# Patient Record
Sex: MALE | Race: WHITE | Employment: FULL TIME | URBAN - METROPOLITAN AREA
[De-identification: names, ages, dates, MRNs, and addresses within clinical notes are randomized per-mention and may not be internally consistent; named-entity substitution may affect disease eponyms.]

---

## 2017-03-20 NOTE — PATIENT INSTRUCTIONS
Preventive Health Recommendations  Male Ages 40 to 49    *   Overall, doing well.     *   Drop 15 lbs and keep it off.     Yearly exam:             See your health care provider every year in order to  o   Review health changes.   o   Discuss preventive care.    o   Review your medicines if your doctor has prescribed any.    You should be tested each year for STDs (sexually transmitted diseases) if you re at risk.     Have a cholesterol test every 5 years.     Have a colonoscopy (test for colon cancer) if someone in your family has had colon cancer or polyps before age 50.     After age 45, have a diabetes test (fasting glucose). If you are at risk for diabetes, you should have this test every 3 years.      Talk with your health care provider about whether or not a prostate cancer screening test (PSA) is right for you.    Shots: Get a flu shot each year. Get a tetanus shot every 10 years.     Nutrition:    Eat at least 5 servings of fruits and vegetables daily.     Eat whole-grain bread, whole-wheat pasta and brown rice instead of white grains and rice.     Talk to your provider about Calcium and Vitamin D.     Lifestyle    Exercise for at least 150 minutes a week (30 minutes a day, 5 days a week). This will help you control your weight and prevent disease.     Limit alcohol to one drink per day.     No smoking.     Wear sunscreen to prevent skin cancer.     See your dentist every six months for an exam and cleaning.

## 2017-03-20 NOTE — PROGRESS NOTES
SUBJECTIVE:     CC: Isaac Bello is an 41 year old male who presents for preventative health visit.     Healthy Habits:    Do you get at least three servings of calcium containing foods daily (dairy, green leafy vegetables, etc.)? yes    Amount of exercise or daily activities, outside of work: 3 day(s) per week    Problems taking medications regularly No    Medication side effects: No    Have you had an eye exam in the past two years? no    Do you see a dentist twice per year? Yes    Do you have sleep apnea, excessive snoring or daytime drowsiness? no      - History of elevated BP without diagnosis of hypertension. Patient's recent BPs have been within normal guidelines.    BP Readings from Last 6 Encounters:   03/24/17 122/70   01/23/14 122/77   11/13/13 129/76   09/30/13 122/80   02/09/12 145/78   02/01/11 110/66     - Discussed weight. Patient reports he has been able to get down to 210 lbs multiple times in the last few years, but has had trouble staying at a lower weight.     Wt Readings from Last 10 Encounters:   03/24/17 227 lb 2 oz (103 kg)   01/23/14 226 lb (102.5 kg)   11/13/13 226 lb (102.5 kg)   09/30/13 219 lb (99.3 kg)   02/09/12 229 lb (103.9 kg)   02/01/11 225 lb (102.1 kg)   08/09/10 223 lb (101.2 kg)   06/21/10 219 lb (99.3 kg)   05/28/10 210 lb 8 oz (95.5 kg)       Family History: Colon cancer (Paternal aunt), melanoma (Mother). No family history of prostate cancer or heart disease.    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 Father of 11-year-old twins.        Today's PHQ-2 Score:   PHQ-2 ( 1999 [employer]) 3/24/2017 9/30/2013   Q1: Little interest or pleasure in doing things 0 0   Q2: Feeling down, depressed or hopeless 0 0   PHQ-2 Score 0 0       Abuse: Current or Past(Physical, Sexual or Emotional)- No  Do you feel safe in your environment - Yes    Social History   Substance Use Topics     Smoking status: Never Smoker     Smokeless tobacco: Never  "Used     Alcohol use Yes      Comment: occl     The patient does not drink >3 drinks per day nor >7 drinks per week.    Last PSA: No results found for: PSA    Recent Labs   Lab Test  02/01/11   0809   CHOL  182   HDL  48   LDL  118   TRIG  74   CHOLHDLRATIO  4.0       Reviewed orders with patient. Reviewed health maintenance and updated orders accordingly - Yes    Reviewed and updated as needed this visit by clinical staff  Tobacco  Allergies  Meds  Med Hx  Surg Hx  Fam Hx  Soc Hx        Reviewed and updated as needed this visit by Provider          ROS:  C: NEGATIVE for fever, chills, significant change in weight  I: Patient seen by Dermatology yesterday, reports normal exam. NEGATIVE for worrisome rashes, moles or lesions   E: NEGATIVE for vision changes or irritation  ENT: NEGATIVE for ear, mouth and throat problems  R: NEGATIVE for significant cough or SOB  CV: NEGATIVE for chest pain, palpitations or peripheral edema  GI: NEGATIVE for nausea, abdominal pain, heartburn, or change in bowel habits  M: NEGATIVE for significant arthralgias or myalgia  N: NEGATIVE for weakness, dizziness or paresthesias      This document serves as a record of the services and decisions personally performed and made by Brenda Marx MD. It was created on his behalf by Fallon Palmer, a trained medical scribe. The creation of this document is based the provider's statements to the medical scribe.  Fallon Palmer 8:08 AM March 24, 2017    Problem list, Medication list, Allergies, and Medical/Social/Surgical histories reviewed in [facility] and updated as appropriate.  OBJECTIVE:     /70 (BP Location: Left arm, Patient Position: Chair, Cuff Size: Adult Large)  Pulse 66  Temp 98.6  F (37  C) (Tympanic)  Ht 5' 11\" (1.803 m)  Wt 227 lb 2 oz (103 kg)  BMI 31.68 kg/m2     EXAM:  GENERAL: healthy, alert and no distress  RESP: lungs clear to auscultation - no rales, rhonchi or wheezes  CV: regular rate and rhythm, normal S1 S2, no " "murmur  MS: no gross musculoskeletal defects noted, no edema  SKIN: no suspicious lesions or rashes  NEURO: Normal strength and tone, mentation intact and speech normal  PSYCH: mentation appears normal, affect normal/bright    ASSESSMENT/PLAN:       (Z00.00) Routine general medical examination at a health care facility  (primary encounter diagnosis)  Comment: Overall doing well. Discussed lifestyle.  Plan: Encouraged regular exercise, weight loss ~10-15 lbs.     (Z13.6) CARDIOVASCULAR SCREENING; LDL GOAL LESS THAN 160  Comment: Routine.  Plan: Lipid Profile with reflex to direct LDL - Results pending.    (Z13.1) Screening for diabetes mellitus  Comment: Routine.  Plan: Glucose - Results pending.    (Z80.0) Family history of colon cancer  Comment: Second degree relative (paternal aunt). Updated problem list.  Plan: Routine colonoscopy at age 49.      Patient will follow up in 12 months for annual physical, sooner PRN for other health maintenance. Patient instructed to call with any questions or concerns.      COUNSELING:  Reviewed preventive health counseling, as reflected in patient instructions  Special attention given to:        Regular exercise       Healthy diet/nutrition         reports that he has never smoked. He has never used smokeless tobacco.    Estimated body mass index is 31.68 kg/(m^2) as calculated from the following:    Height as of this encounter: 5' 11\" (1.803 m).    Weight as of this encounter: 227 lb 2 oz (103 kg).   Weight management plan: Encouraged low-carb diet, regular exercise.    Counseling Resources:  ATP IV Guidelines  Pooled Cohorts Equation Calculator  FRAX Risk Assessment  ICSI Preventive Guidelines  Dietary Guidelines for Americans, 2010  USDA's MyPlate  ASA Prophylaxis  Lung CA Screening    The information in this document, created by a scribe for me, accurately reflects the services I personally performed and the decisions made by me. I have reviewed and approved this document for " accuracy.  8:13 AM March 24, 2017    Brenda Marx MD  [facility]

## 2017-03-24 ENCOUNTER — OFFICE VISIT (OUTPATIENT)
Dept: FAMILY MEDICINE | Facility: CLINIC | Age: 42
End: 2017-03-24
Payer: COMMERCIAL

## 2017-03-24 VITALS
BODY MASS INDEX: 31.8 KG/M2 | HEIGHT: 71 IN | SYSTOLIC BLOOD PRESSURE: 122 MMHG | TEMPERATURE: 98.6 F | DIASTOLIC BLOOD PRESSURE: 70 MMHG | WEIGHT: 227.13 LBS | HEART RATE: 66 BPM

## 2017-03-24 DIAGNOSIS — Z00.00 ROUTINE GENERAL MEDICAL EXAMINATION AT A HEALTH CARE FACILITY: Primary | ICD-10-CM

## 2017-03-24 DIAGNOSIS — Z80.0 FAMILY HISTORY OF COLON CANCER: ICD-10-CM

## 2017-03-24 DIAGNOSIS — Z13.6 CARDIOVASCULAR SCREENING; LDL GOAL LESS THAN 160: ICD-10-CM

## 2017-03-24 DIAGNOSIS — Z13.1 SCREENING FOR DIABETES MELLITUS: ICD-10-CM

## 2017-03-24 LAB
CHOLEST SERPL-MCNC: 211 MG/DL
GLUCOSE SERPL-MCNC: 101 MG/DL (ref 70–99)
HDLC SERPL-MCNC: 63 MG/DL
LDLC SERPL CALC-MCNC: 129 MG/DL
NONHDLC SERPL-MCNC: 148 MG/DL
TRIGL SERPL-MCNC: 97 MG/DL

## 2017-03-24 PROCEDURE — 82947 ASSAY GLUCOSE BLOOD QUANT: CPT | Performed by: FAMILY MEDICINE

## 2017-03-24 PROCEDURE — 36415 COLL VENOUS BLD VENIPUNCTURE: CPT | Performed by: FAMILY MEDICINE

## 2017-03-24 PROCEDURE — 99396 PREV VISIT EST AGE 40-64: CPT | Performed by: FAMILY MEDICINE

## 2017-03-24 PROCEDURE — 80061 LIPID PANEL: CPT | Performed by: FAMILY MEDICINE

## 2017-03-24 NOTE — MR AVS SNAPSHOT
After Visit Summary   3/24/2017    Isaac Bello    MRN: [id number]           Patient Information     Date Of Birth          1975        Visit Information        Provider Department      3/24/2017 7:40 AM Brenda Marx MD [facility]        Today's Diagnoses     Routine general medical examination at a health care facility    -  1    CARDIOVASCULAR SCREENING; LDL GOAL LESS THAN 160        Screening for diabetes mellitus        Family history of colon cancer          Care Instructions      Preventive Health Recommendations  Male Ages 40 to 49    *   Overall, doing well.     *   Drop 15 lbs and keep it off.     Yearly exam:             See your health care provider every year in order to  o   Review health changes.   o   Discuss preventive care.    o   Review your medicines if your doctor has prescribed any.    You should be tested each year for STDs (sexually transmitted diseases) if you re at risk.     Have a cholesterol test every 5 years.     Have a colonoscopy (test for colon cancer) if someone in your family has had colon cancer or polyps before age 50.     After age 45, have a diabetes test (fasting glucose). If you are at risk for diabetes, you should have this test every 3 years.      Talk with your health care provider about whether or not a prostate cancer screening test (PSA) is right for you.    Shots: Get a flu shot each year. Get a tetanus shot every 10 years.     Nutrition:    Eat at least 5 servings of fruits and vegetables daily.     Eat whole-grain bread, whole-wheat pasta and brown rice instead of white grains and rice.     Talk to your provider about Calcium and Vitamin D.     Lifestyle    Exercise for at least 150 minutes a week (30 minutes a day, 5 days a week). This will help you control your weight and prevent disease.     Limit alcohol to one drink per day.     No smoking.     Wear sunscreen to prevent skin cancer.     See your dentist every six  "months for an exam and cleaning.            Follow-ups after your visit        Who to contact     Normal or non-critical lab and imaging results will be communicated to you by [employer], letter or phone within 4 business days after the clinic has received the results. If you do not hear from us within 7 days, please contact the clinic through [employer] or phone. If you have a critical or abnormal lab result, we will notify you by phone as soon as possible.  Submit refill requests through [employer] or call your pharmacy and they will forward the refill request to us. Please allow 3 business days for your refill to be completed.          If you need to speak with a  for additional information , please call: [phone]           Additional Information About Your Visit        [employer] Information     [employer] gives you secure access to your electronic health record. If you see a primary care provider, you can also send messages to your care team and make appointments. If you have questions, please call your primary care clinic.  If you do not have a primary care provider, please call [phone] and they will assist you.        Care EveryWhere ID     This is your Care EveryWhere ID. This could be used by other organizations to access your [facility] medical records  [id number]        Your Vitals Were     Pulse Temperature Height BMI (Body Mass Index)          66 98.6  F (37  C) (Tympanic) 5' 11\" (1.803 m) 31.68 kg/m2         Blood Pressure from Last 3 Encounters:   03/24/17 122/70   01/23/14 122/77   11/13/13 129/76    Weight from Last 3 Encounters:   03/24/17 227 lb 2 oz (103 kg)   01/23/14 226 lb (102.5 kg)   11/13/13 226 lb (102.5 kg)              We Performed the Following     Glucose     Lipid Profile with reflex to direct LDL          Today's Medication Changes          These changes are accurate as of: 3/24/17  8:14 AM.  If you have any questions, ask your nurse or doctor.               Stop taking " these medicines if you haven't already. Please contact your care team if you have questions.     budesonide 0.5 MG/2ML neb solution   Commonly known as:  PULMICORT   Stopped by:  Brenda Marx MD                    Primary Care Provider Office Phone # Fax #    Manuel Yun [phone]       [facility] [address]   [place] [address]        Thank you!     Thank you for choosing [facility]  for your care. Our goal is always to provide you with excellent care. Hearing back from our patients is one way we can continue to improve our services. Please take a few minutes to complete the written survey that you may receive in the mail after your visit with us. Thank you!             Your Updated Medication List - Protect others around you: Learn how to safely use, store and throw away your medicines at www.disposemymeds.org.          This list is accurate as of: 3/24/17  8:14 AM.  Always use your most recent med list.                   Brand Name Dispense Instructions for use    ALLERGEN IMMUNOTHERAPY PRESCRIPTION          fluticasone 50 MCG/ACT spray    FLONASE    3 Package    Spray 2 sprays in nostril daily as needed for rhinitis       ZYRTEC ALLERGY 10 MG Caps   Generic drug:  cetirizine HCl      Take 1 capsule by mouth daily

## 2017-03-24 NOTE — NURSING NOTE
"Chief Complaint   Patient presents with     Physical       Initial /70 (BP Location: Left arm, Patient Position: Chair, Cuff Size: Adult Large)  Pulse 66  Temp 98.6  F (37  C) (Tympanic)  Ht 5' 11\" (1.803 m)  Wt 227 lb 2 oz (103 kg)  BMI 31.68 kg/m2 Estimated body mass index is 31.68 kg/(m^2) as calculated from the following:    Height as of this encounter: 5' 11\" (1.803 m).    Weight as of this encounter: 227 lb 2 oz (103 kg).  Medication Reconciliation: complete    "

## 2018-01-27 ENCOUNTER — TRANSFERRED RECORDS (OUTPATIENT)
Dept: HEALTH INFORMATION MANAGEMENT | Facility: CLINIC | Age: 43
End: 2018-01-27

## 2019-03-19 ENCOUNTER — TRANSFERRED RECORDS (OUTPATIENT)
Dept: HEALTH INFORMATION MANAGEMENT | Facility: CLINIC | Age: 44
End: 2019-03-19

## 2019-04-03 ENCOUNTER — OFFICE VISIT (OUTPATIENT)
Dept: FAMILY MEDICINE | Facility: CLINIC | Age: 44
End: 2019-04-03
Payer: COMMERCIAL

## 2019-04-03 VITALS
BODY MASS INDEX: 33.07 KG/M2 | SYSTOLIC BLOOD PRESSURE: 100 MMHG | WEIGHT: 236.25 LBS | RESPIRATION RATE: 14 BRPM | DIASTOLIC BLOOD PRESSURE: 70 MMHG | HEIGHT: 71 IN | TEMPERATURE: 98.9 F | HEART RATE: 62 BPM

## 2019-04-03 DIAGNOSIS — Z13.1 SCREENING FOR DIABETES MELLITUS: ICD-10-CM

## 2019-04-03 DIAGNOSIS — Z13.6 CARDIOVASCULAR SCREENING; LDL GOAL LESS THAN 160: ICD-10-CM

## 2019-04-03 DIAGNOSIS — K57.32 DIVERTICULITIS OF COLON: Primary | ICD-10-CM

## 2019-04-03 LAB
CHOLEST SERPL-MCNC: 189 MG/DL
GLUCOSE SERPL-MCNC: 96 MG/DL (ref 70–99)
HDLC SERPL-MCNC: 58 MG/DL
LDLC SERPL CALC-MCNC: 116 MG/DL
NONHDLC SERPL-MCNC: 131 MG/DL
TRIGL SERPL-MCNC: 73 MG/DL

## 2019-04-03 PROCEDURE — 80061 LIPID PANEL: CPT | Performed by: FAMILY MEDICINE

## 2019-04-03 PROCEDURE — 82947 ASSAY GLUCOSE BLOOD QUANT: CPT | Performed by: FAMILY MEDICINE

## 2019-04-03 PROCEDURE — 36415 COLL VENOUS BLD VENIPUNCTURE: CPT | Performed by: FAMILY MEDICINE

## 2019-04-03 PROCEDURE — 99214 OFFICE O/P EST MOD 30 MIN: CPT | Performed by: FAMILY MEDICINE

## 2019-04-03 ASSESSMENT — PAIN SCALES - GENERAL: PAINLEVEL: NO PAIN (0)

## 2019-04-03 ASSESSMENT — MIFFLIN-ST. JEOR: SCORE: 1983.75

## 2019-04-03 NOTE — PATIENT INSTRUCTIONS
*   Could be traveler's diarrhea or diverticulitis. Not sure.     *   Okay to watch and wait.     *   No urgent need for a colonoscopy.     *   Come back this summer for a general physical.     *   No dietary recommendations.     *   Your blood pressure is fine.     *   Call or use [employer] with any questions.

## 2019-04-03 NOTE — PROGRESS NOTES
SUBJECTIVE:   Isaac Bello is a 43 year old male who presents to clinic today for the following health issues:      ED/UC Followup:    Facility:  [facility]  Date of visit: 3/19/2019  Reason for visit: Acute diverticulitis  Current Status: He has completed courses of flagyl and omnicef with improvement. No current fevers. No abdominal or back ain now, no urinary sx, constipation, emesis or nausea. He is having frequent loose stools. Finished antibiotics 3/29/2019. Notes more aware of digestive system.       EXAM: CT ABDOMEN PELVIS W    LOCATION: [facility] [place]    DATE/TIME: 3/19/2019 7:32 PM        INDICATION: Left lower quadrant pain.        FINDINGS:     LUNG BASES: Negative.        ABDOMEN: Mild mesenteric stranding around the mid descending colon. There is an    adjacent diverticuli, therefore this most likely represents diverticulitis. No    perforation or abscess seen.        Small hepatic cyst. Circumaortic left renal vein. Left renal small cyst.    Normal-appearing right kidney, adrenals, spleen, contracted gallbladder, and    pancreas.        PELVIS: Sigmoid diverticuli also. Small bowel and appendix appear normal.        MUSCULOSKELETAL: Negative.        CONCLUSION:     1.  Mild mid descending colon diverticulitis.        Per Radiologist           Problem list and histories reviewed & adjusted, as indicated.  Additional history: as documented    Labs reviewed in EPIC    Reviewed and updated as needed this visit by clinical staff  Tobacco  Allergies  Meds  Med Hx  Surg Hx  Fam Hx  Soc Hx      Reviewed and updated as needed this visit by Provider         ROS:  CONSTITUTIONAL: NEGATIVE for fever, chills, change in weight  INTEGUMENTARY/SKIN: NEGATIVE for worrisome rashes, moles or lesions  ENT/MOUTH: NEGATIVE for ear, mouth and throat problems  RESP: NEGATIVE for significant cough or SOB  CV: NEGATIVE for chest pain, palpitations or peripheral edema  GI: POSITIVE for abdominal  "discomfort and loose stools  PSYCHIATRIC: NEGATIVE for changes in mood or affect    This document serves as a record of the services and decisions personally performed and made by Brenda Marx MD. It was created on his behalf by Abiodun Bateman, a trained medical scribe. The creation of this document is based the provider's statements to the medical scribe.  Abiodun Bateman 8:15 AM April 3, 2019    OBJECTIVE:                                                    /70   Pulse 62   Temp 98.9  F (37.2  C) (Tympanic)   Resp 14   Ht 1.803 m (5' 11\")   Wt 107.2 kg (236 lb 4 oz)   BMI 32.95 kg/m    Body mass index is 32.95 kg/m .   GENERAL: healthy, alert, pleasant, and in no acute distress  RESP: lungs clear to auscultation - no rales, no rhonchi, no wheezes  CV: regular rates and rhythm, normal S1 S2  ABDOMEN: soft, no tenderness  PSYCH: mentation appears normal, affect normal/bright      Diagnostic Test Results:  Results for orders placed or performed in visit on 04/03/19   Lipid panel reflex to direct LDL Fasting   Result Value Ref Range    Cholesterol 189 <200 mg/dL    Triglycerides 73 <150 mg/dL    HDL Cholesterol 58 >39 mg/dL    LDL Cholesterol Calculated 116 (H) <100 mg/dL    Non HDL Cholesterol 131 (H) <130 mg/dL   Glucose   Result Value Ref Range    Glucose 96 70 - 99 mg/dL         ASSESSMENT/PLAN:                                                    (K57.32) Diverticulitis of colon  (primary encounter diagnosis)  Comment: Clinically improved.  Given onset of symptoms while in [place], could not exclude travelers diarrhea enteritis.  Plan: Advised to complete antibiotics as prescribed and monitor for a pattern of symptoms.  Reviewed that prior dietary recommendations to preventative diverticulitis may not be valid.    (Z13.6) CARDIOVASCULAR SCREENING; LDL GOAL LESS THAN 160  Comment: Preventative screening.  Lipid panel acceptable.  Recheck in 3 to 5 years.  Plan: Lipid panel reflex to direct LDL Fasting        "   (Z13.1) Screening for diabetes mellitus  Comment: Preventative screening.  No signs of diabetes.  Plan: Glucose          Patient Instructions   *   Could be traveler's diarrhea or diverticulitis. Not sure.     *   Okay to watch and wait.     *   No urgent need for a colonoscopy.     *   Come back this summer for a general physical.     *   No dietary recommendations.     *   Your blood pressure is fine.     *   Call or use [employer] with any questions.     See Patient Instructions    The information in this document, created by a scribe for me, accurately reflects the services I personally performed and the decisions made by me. I have reviewed and approved this document for accuracy.     Brenda Marx MD  [facility]

## 2019-05-16 ENCOUNTER — TRANSFERRED RECORDS (OUTPATIENT)
Dept: HEALTH INFORMATION MANAGEMENT | Facility: CLINIC | Age: 44
End: 2019-05-16

## 2019-07-25 ENCOUNTER — TRANSFERRED RECORDS (OUTPATIENT)
Dept: HEALTH INFORMATION MANAGEMENT | Facility: CLINIC | Age: 44
End: 2019-07-25

## 2019-11-05 ENCOUNTER — HEALTH MAINTENANCE LETTER (OUTPATIENT)
Age: 44
End: 2019-11-05

## 2020-11-22 ENCOUNTER — HEALTH MAINTENANCE LETTER (OUTPATIENT)
Age: 45
End: 2020-11-22

## 2021-03-23 ENCOUNTER — IMMUNIZATION (OUTPATIENT)
Dept: NURSING | Facility: CLINIC | Age: 46
End: 2021-03-23
Payer: COMMERCIAL

## 2021-03-23 PROCEDURE — 91300 PR COVID VAC PFIZER DIL RECON 30 MCG/0.3 ML IM: CPT

## 2021-03-23 PROCEDURE — 0001A PR COVID VAC PFIZER DIL RECON 30 MCG/0.3 ML IM: CPT

## 2021-04-13 ENCOUNTER — IMMUNIZATION (OUTPATIENT)
Dept: NURSING | Facility: CLINIC | Age: 46
End: 2021-04-13
Attending: INTERNAL MEDICINE
Payer: COMMERCIAL

## 2021-04-13 PROCEDURE — 91300 PR COVID VAC PFIZER DIL RECON 30 MCG/0.3 ML IM: CPT

## 2021-04-13 PROCEDURE — 0002A PR COVID VAC PFIZER DIL RECON 30 MCG/0.3 ML IM: CPT

## 2021-09-19 ENCOUNTER — HEALTH MAINTENANCE LETTER (OUTPATIENT)
Age: 46
End: 2021-09-19

## 2022-01-09 ENCOUNTER — HEALTH MAINTENANCE LETTER (OUTPATIENT)
Age: 47
End: 2022-01-09

## 2022-11-21 ENCOUNTER — HEALTH MAINTENANCE LETTER (OUTPATIENT)
Age: 47
End: 2022-11-21

## 2023-04-16 ENCOUNTER — HEALTH MAINTENANCE LETTER (OUTPATIENT)
Age: 48
End: 2023-04-16